# Patient Record
(demographics unavailable — no encounter records)

---

## 2025-05-19 NOTE — HISTORY OF PRESENT ILLNESS
[FreeTextEntry1] : 42yo M in the office for evaluation of arthritis  History: Patient with recent ER visit right ankle and knee pain there was swelling and discomfort with active ROM on the ankle prescribed ketorolac  progressive improvement of symptoms eventually resolved no new medical events since that April ED visit  Prior history: patient had a podiatry impression of possible gout 8 years ago podagra attack single event during this time